# Patient Record
Sex: MALE | Race: OTHER
[De-identification: names, ages, dates, MRNs, and addresses within clinical notes are randomized per-mention and may not be internally consistent; named-entity substitution may affect disease eponyms.]

---

## 2017-05-10 ENCOUNTER — HOSPITAL ENCOUNTER (OUTPATIENT)
Dept: HOSPITAL 74 - JER | Age: 57
Setting detail: OBSERVATION
LOS: 2 days | Discharge: HOME | End: 2017-05-12
Attending: INTERNAL MEDICINE | Admitting: INTERNAL MEDICINE
Payer: COMMERCIAL

## 2017-05-10 VITALS — BODY MASS INDEX: 29 KG/M2

## 2017-05-10 DIAGNOSIS — Z79.82: ICD-10-CM

## 2017-05-10 DIAGNOSIS — I71.2: ICD-10-CM

## 2017-05-10 DIAGNOSIS — I95.2: ICD-10-CM

## 2017-05-10 DIAGNOSIS — I10: ICD-10-CM

## 2017-05-10 DIAGNOSIS — E78.5: ICD-10-CM

## 2017-05-10 DIAGNOSIS — Z91.013: ICD-10-CM

## 2017-05-10 DIAGNOSIS — I35.1: ICD-10-CM

## 2017-05-10 DIAGNOSIS — I25.10: ICD-10-CM

## 2017-05-10 DIAGNOSIS — R55: Primary | ICD-10-CM

## 2017-05-10 LAB
ALBUMIN SERPL-MCNC: 4 G/DL (ref 3.4–5)
ALP SERPL-CCNC: 54 U/L (ref 45–117)
ALT SERPL-CCNC: 24 U/L (ref 12–78)
ANION GAP SERPL CALC-SCNC: 12 MMOL/L (ref 8–16)
AST SERPL-CCNC: 22 U/L (ref 15–37)
BASOPHILS # BLD: 0.5 % (ref 0–2)
BILIRUB SERPL-MCNC: 0.8 MG/DL (ref 0.2–1)
CALCIUM SERPL-MCNC: 9 MG/DL (ref 8.5–10.1)
CO2 SERPL-SCNC: 26 MMOL/L (ref 21–32)
COCKROFT - GAULT: 68.03
CREAT SERPL-MCNC: 1.4 MG/DL (ref 0.7–1.3)
DEPRECATED RDW RBC AUTO: 14.3 % (ref 11.9–15.9)
EOSINOPHIL # BLD: 1 % (ref 0–4.5)
GLUCOSE SERPL-MCNC: 112 MG/DL (ref 74–106)
INR BLD: 1.05 (ref 0.82–1.09)
MCH RBC QN AUTO: 30.6 PG (ref 25.7–33.7)
MCHC RBC AUTO-ENTMCNC: 34.1 G/DL (ref 32–35.9)
MCV RBC: 89.7 FL (ref 80–96)
NEUTROPHILS # BLD: 73.3 % (ref 42.8–82.8)
PLATELET # BLD AUTO: 175 K/MM3 (ref 134–434)
PMV BLD: 7.7 FL (ref 7.5–11.1)
PROT SERPL-MCNC: 7.2 G/DL (ref 6.4–8.2)
PT PNL PPP: 11.6 SEC (ref 9.98–11.88)
TROPONIN I SERPL-MCNC: < 0.02 NG/ML (ref 0–0.05)
WBC # BLD AUTO: 5.6 K/MM3 (ref 4–10)

## 2017-05-10 PROCEDURE — G0378 HOSPITAL OBSERVATION PER HR: HCPCS

## 2017-05-10 PROCEDURE — 3E0337Z INTRODUCTION OF ELECTROLYTIC AND WATER BALANCE SUBSTANCE INTO PERIPHERAL VEIN, PERCUTANEOUS APPROACH: ICD-10-PCS | Performed by: INTERNAL MEDICINE

## 2017-05-10 PROCEDURE — 3E033GC INTRODUCTION OF OTHER THERAPEUTIC SUBSTANCE INTO PERIPHERAL VEIN, PERCUTANEOUS APPROACH: ICD-10-PCS | Performed by: INTERNAL MEDICINE

## 2017-05-10 NOTE — PDOC
History of Present Illness





- General


History Source: Patient


Exam Limitations: No Limitations





<Joel Brown - Last Filed: 05/10/17 16:53>





<Ingrid Pang - Last Filed: 05/10/17 19:20>





- General


Chief Complaint: Syncope/Near Syncope


Stated Complaint: FALL


Time Seen by Provider: 05/10/17 13:01





- History of Present Illness


Initial Comments: 


05/10/17 14:30


The patient is a 56 year old male, BIBA with a significant past medical history 

of HTN and HLD, who presents to the emergency department s/p syncopal fall 

around 11:30 today. The patient reports having a less than 5 minute of LOC in 

the bank earlier today. He reports just prior to the LOC being symptomatic for 

blurry vision and dizziness.  He denies any recent fevers, chills, headache or 

dizziness. He denies any recent nausea, vomit, diarrhea or constipation. He 

denies any recent chest pain or shortness of breath. He arrives with no 

complaints of pain or discomfort. 





Allergies: NKA


Past surgical history: None reported.


Social History: Nonsmoker. Denies EtOH use and recreational drug use. 


 (Joel Brown)








Past History





<Joel Brown - Last Filed: 05/10/17 16:53>





- Past Medical History


HTN: Yes


Hypercholesterolemia: Yes





- Psycho/Social/Smoking Cessation Hx


Suicidal Ideation: No


Smoking History: Never smoked





<Ingrid Pang - Last Filed: 05/10/17 19:20>





- Past Medical History


Allergies/Adverse Reactions: 


 Allergies











Allergy/AdvReac Type Severity Reaction Status Date / Time


 


Fish Containing Products Allergy   Verified 05/10/17 12:27











Home Medications: 


Ambulatory Orders





Aspirin [ASA -] 81 mg PO DAILY 05/10/17 


Isosorbide Mononitrate [Isosorbide Mononitrate ER] 120 mg PO DAILY 05/10/17 


Labetalol HCl [Normodyne -] 300 mg PO BID 05/10/17 


Nifedipine [Nifedipine ER] 60 mg PO BID 05/10/17 


Rosuvastatin Calcium [Crestor] 40 mg PO DAILY 05/10/17 











**Review of Systems





- Review of Systems


Able to Perform ROS?: Yes





<Joel Brown - Last Filed: 05/10/17 16:53>





<Ingrid Pang - Last Filed: 05/10/17 19:20>





- Review of Systems


Comments:: 





05/10/17 14:30


GENERAL/CONSTITUTIONAL: No fever or chills. No weakness.


HEAD, EYES, EARS, NOSE AND THROAT: No change in vision. No ear pain or 

discharge. No sore throat.


CARDIOVASCULAR: No chest pain or shortness of breath.


RESPIRATORY: No cough, wheezing, or hemoptysis.


GASTROINTESTINAL: No nausea, vomiting, diarrhea or constipation.


GENITOURINARY: No dysuria, frequency, or change in urination.


MUSCULOSKELETAL: No joint or muscle swelling or pain. No neck or back pain.


SKIN: No rash


NEUROLOGIC: No headache, vertigo, loss of consciousness, or change in strength/

sensation.


ENDOCRINE: No increased thirst. No abnormal weight change.


HEMATOLOGIC/LYMPHATIC: No anemia, easy bleeding, or history of blood clots.


ALLERGIC/IMMUNOLOGIC: No hives or skin allergy. (Joel Brown)








*Physical Exam





<Joel Brown - Last Filed: 05/10/17 16:53>





<Ingrid Pang - Last Filed: 05/10/17 19:20>





- Vital Signs


 Last Vital Signs











Temp Pulse Resp BP Pulse Ox


 


 98.9 F   91 H  17   110/75   98 


 


 05/10/17 19:00  05/10/17 19:00  05/10/17 19:00  05/10/17 19:00  05/10/17 19:00

















- Physical Exam


Comments: 





05/10/17 16:30


GENERAL: Awake, alert, and fully oriented, in no acute distress


HEAD: No signs of trauma


EYES: PERRLA, EOMI, sclera anicteric, conjunctiva clear


ENT: Auricles normal inspection, hearing grossly normal, nares patent, Moist 

mucosa


NECK: Normal ROM, supple, JVD, or masses


LUNGS: Breath sounds equal, clear to auscultation bilaterally.  No wheezes, and 

no crackles


HEART: Regular rate and rhythm, normal S1 and S2, no murmurs, rubs or gallops


ABDOMEN: Soft, nontender, normoactive bowel sounds.  No guarding, no rebound.  

No masses


EXTREMITIES: Normal range of motion, no edema.  No clubbing or cyanosis. No 

cords, erythema, or tenderness. 2+DP/PT pulses. 


NEUROLOGICAL:  Normal speech, normal gait, moves all extremities equally. 

Speech clear. 


SKIN: Warm, Dry, normal turgor, no rashes or lesions noted.


 (Joel Brown)








ED Treatment Course





- LABORATORY


CBC & Chemistry Diagram: 


 05/10/17 13:30





 05/10/17 13:30





<Joel Brown - Last Filed: 05/10/17 16:53>





- LABORATORY


CBC & Chemistry Diagram: 


 05/10/17 13:30





 05/10/17 13:30





<Ingrid Pang - Last Filed: 05/10/17 19:20>





- ADDITIONAL ORDERS


Additional order review: 


 Laboratory  Results











  05/10/17 05/10/17





  13:30 13:04


 


INR   1.05


 


Sodium  140 


 


Potassium  3.9 


 


Chloride  102 


 


Carbon Dioxide  26 


 


Anion Gap  12 


 


BUN  16 


 


Creatinine  1.4 H 


 


Creat Clearance w eGFR  52.42 


 


Random Glucose  112 H 


 


Calcium  9.0 


 


Total Bilirubin  0.8 


 


AST  22 


 


ALT  24 


 


Alkaline Phosphatase  54 


 


Creatine Kinase  122 


 


Troponin I  < 0.02 


 


Total Protein  7.2 


 


Albumin  4.0 








 











  05/10/17





  13:30


 


RBC  4.31


 


MCV  89.7


 


MCHC  34.1


 


RDW  14.3


 


MPV  7.7


 


Neutrophils %  73.3


 


Lymphocytes %  15.7


 


Monocytes %  9.5


 


Eosinophils %  1.0


 


Basophils %  0.5

















- RADIOLOGY


Radiology Studies Ordered: 














 Category Date Time Status


 


 ABDOMEN CTA W/WO CONTRAST [CT] Stat CT Scan  05/10/17 18:52 Ordered


 


 CHEST CTA [CT] Stat CT Scan  05/10/17 18:51 Ordered


 


 HEAD CT WITHOUT CONTRAST [CT] Stat CT Scan  05/10/17 14:19 Completed


 


 CXRPORT [CHEST X-RAY PORTABLE*] [RAD] Stat Radiology  05/10/17 14:19 Completed

















- Medications


Given in the ED: 


ED Medications














Discontinued Medications














Generic Name Dose Route Start Last Admin





  Trade Name Freq  PRN Reason Stop Dose Admin


 


Aspirin  162 mg 05/10/17 14:27 05/10/17 14:33





  Asa -  PO 05/10/17 14:28  Not Given





  ONCE ONE   


 


Sodium Chloride  1,000 ml 05/10/17 14:26 05/10/17 14:32





  Normal Saline -  IV 05/10/17 14:27  1,000 ml





  ONCE ONE   Administration

















Medical Decision Making





<Joel Brown - Last Filed: 05/10/17 16:53>





<Ingrid Pang - Last Filed: 05/10/17 19:20>





- Medical Decision Making





05/10/17 16:53


Call made to , case discussed. 


 (Joel Brown)


05/10/17 14:20


55 yo male with h/o HTN HLD, here with syncopal episode today while at the bank

, on baby aspirin.  unsure if hit head, brief loc for 5 min. no prior syncopal 

epsiodes . no cp sob precipitating. no dark stools receently. EMS brought to 

hospital noted to be hypotensive.  no current complaints of headache. 


on exam awake alert neurologically intact. no head trauma. no cervical 

tendnerss. cardiac exam normal. 


differential : traumatic, Ich, hypoglycemia, dehyration anemia, vasovagal, 

dysrhtymia, electrlyte abnormality. plan  ct head, ekg cardiac markers. tele, 

iv hydration labs,  urinalysis. brandon d/w patients cardiologist





05/10/17 17:55


d/w pt covering cardiologist dr douglas ( dr. suazo) who states pt had normal 

stress 2/17, echo last year was reportedly normal. also has h/o asc. aortic 

anuerysm. ct angio added. will see pt in hospital . will observe on telemetry. d

/w dr mckeon, ( admitting for marimar santizo)  who accepted admission to tele.





05/10/17 19:20 (Ingrid Pang)








*DC/Admit/Observation/Transfer





<Joel Brown - Last Filed: 05/10/17 16:53>





- Discharge Dispostion


Admit: Yes





<Ingrid Pang - Last Filed: 05/10/17 19:20>


Diagnosis at time of Disposition: 


 Syncope





- Referrals


Referrals: 


Ifrah Joseph MD [Primary Care Provider] - 





- Attestations


Scribe Attestion: 





05/10/17 14:35





Documentation prepared by Joel Brown, acting as medical scribe for Ingrid Pang MD.


 (Joel Brown)

## 2017-05-10 NOTE — HP
Admitting History and Physical





- Admission


History of Present Illness: 


Pt is a 55 y/o male with PMH significant for HTN and HLD. Pt was brought to the 

ER bc of syncopal episode w/ LOC while in the bank.  Pt denies any HENDERSON, chest 

pain or palpitations. The patient reports having a less than 5 minute of LOC in 

the bank earlier today. Pt had ct scan head wc was unremarkable and CTA chest/

abdomen wc showed dilatation of the ascending aorta w/out dissection.


History Source: Patient, Medical Record





- Past Medical History


Cardiovascular: Yes: HTN, Hyperlipdemia





- Smoking History


Smoking history: Never smoked





Home Medications





- Allergies


Allergies/Adverse Reactions: 


 Allergies











Allergy/AdvReac Type Severity Reaction Status Date / Time


 


Fish Containing Products Allergy   Verified 05/10/17 12:27














- Home Medications


Home Medications: 


Ambulatory Orders





Aspirin [ASA -] 81 mg PO DAILY 05/10/17 


Isosorbide Mononitrate [Isosorbide Mononitrate ER] 120 mg PO DAILY 05/10/17 


Labetalol HCl [Normodyne -] 300 mg PO BID 05/10/17 


Rosuvastatin Calcium [Crestor] 40 mg PO DAILY 05/10/17 


Losartan Potassium [Cozaar -] 50 mg PO DAILY #30 tablet 05/12/17 


Nifedipine ER [Procardia XL -] 30 mg PO DAILY #30 tab 05/12/17 











Family Disease History





- Family Disease History


Family History: Unremarkable





Review of Systems





- Review of Systems


Constitutional: reports: Weakness


HENT: reports: No Symptoms


Neck: reports: No Symptoms


Cardiovascular: reports: Other (Syncope)


Respiratory: reports: No Symptoms


Gastrointestinal: reports: No Symptoms


Genitourinary: reports: No Symptoms


Musculoskeletal: reports: No Symptoms


Neurological: reports: Change in LOC





Physical Examination


Vital Signs: 


 Vital Signs











Temperature  98.9 F   05/10/17 19:00


 


Pulse Rate  94 H  05/10/17 20:43


 


Respiratory Rate  18   05/10/17 20:43


 


Blood Pressure  127/83   05/10/17 20:43


 


O2 Sat by Pulse Oximetry (%)  97   05/10/17 21:00











Constitutional: Yes: Well Nourished


Eyes: Yes: WNL


HENT: Yes: WNL


Neck: Yes: WNL


Cardiovascular: Yes: WNL, Regular Rate and Rhythm


Respiratory: Yes: WNL, Regular, CTA Bilaterally


Gastrointestinal: Yes: WNL, Normal Bowel Sounds, Soft


Musculoskeletal: Yes: WNL


Extremities: Yes: WNL


Edema: No


Neurological: Yes: WNL, Alert, Oriented


...Motor Strength: WNL





Problem List





- Problems


(1) Syncope


Assessment/Plan: 


Monitor on tele


Cardio/neuro consults


BP was slightly hypotensive


Cont to monitor


Check eho/carotid doppler


Code(s): R55 - SYNCOPE AND COLLAPSE   Qualifiers: 


     Syncope type: unspecified        Qualified Code(s): R55 - Syncope and 

collapse  





(2) HTN (hypertension)


Assessment/Plan: 


Meidcations to be adjusted


Code(s): I10 - ESSENTIAL (PRIMARY) HYPERTENSION   





(3) Ascending aortic aneurysm


Assessment/Plan: 


Pt is to f/u as outpt w/ vascular surgeon


Code(s): I71.2 - THORACIC AORTIC ANEURYSM, WITHOUT RUPTURE

## 2017-05-10 NOTE — EKG
Test Reason : 

Blood Pressure : ***/*** mmHG

Vent. Rate : 066 BPM     Atrial Rate : 066 BPM

   P-R Int : 192 ms          QRS Dur : 094 ms

    QT Int : 424 ms       P-R-T Axes : 004 016 035 degrees

   QTc Int : 444 ms

 

NORMAL SINUS RHYTHM

MINIMAL VOLTAGE CRITERIA FOR LVH, MAY BE NORMAL VARIANT

EARLY REPOLARIZATION

BORDERLINE ECG

WHEN COMPARED WITH ECG OF 09-SEP-2011 09:51,

QRS DURATION HAS DECREASED

ST NO LONGER ELEVATED IN ANTERIOR LEADS

T WAVE INVERSION NO LONGER EVIDENT IN ANTEROLATERAL LEADS

Confirmed by CARLOS CLAROS, SHAVONNE ([id number]) on 5/10/2017 1:42:58 PM

 

Referred By:             Confirmed By:SHAVONNE GONZALEZ MD

## 2017-05-11 LAB
ALBUMIN SERPL-MCNC: 3.7 G/DL (ref 3.4–5)
ALP SERPL-CCNC: 49 U/L (ref 45–117)
ALT SERPL-CCNC: 22 U/L (ref 12–78)
ANION GAP SERPL CALC-SCNC: 8 MMOL/L (ref 8–16)
AST SERPL-CCNC: 17 U/L (ref 15–37)
BASOPHILS # BLD: 0.6 % (ref 0–2)
BILIRUB SERPL-MCNC: 0.7 MG/DL (ref 0.2–1)
CALCIUM SERPL-MCNC: 8.8 MG/DL (ref 8.5–10.1)
CHOLEST SERPL-MCNC: 222 MG/DL (ref 50–200)
CO2 SERPL-SCNC: 28 MMOL/L (ref 21–32)
COCKROFT - GAULT: 119.06
CREAT SERPL-MCNC: 0.8 MG/DL (ref 0.7–1.3)
DEPRECATED RDW RBC AUTO: 14.2 % (ref 11.9–15.9)
EOSINOPHIL # BLD: 1.5 % (ref 0–4.5)
GLUCOSE SERPL-MCNC: 115 MG/DL (ref 74–106)
LDLC SERPL CALC-MCNC: 132 MG/DL (ref 5–100)
MCH RBC QN AUTO: 30.9 PG (ref 25.7–33.7)
MCHC RBC AUTO-ENTMCNC: 34.6 G/DL (ref 32–35.9)
MCV RBC: 89.3 FL (ref 80–96)
NEUTROPHILS # BLD: 55.8 % (ref 42.8–82.8)
PLATELET # BLD AUTO: 168 K/MM3 (ref 134–434)
PMV BLD: 7.4 FL (ref 7.5–11.1)
PROT SERPL-MCNC: 6.9 G/DL (ref 6.4–8.2)
TROPONIN I SERPL-MCNC: < 0.02 NG/ML (ref 0–0.05)
WBC # BLD AUTO: 3.7 K/MM3 (ref 4–10)

## 2017-05-11 RX ADMIN — DEXTROSE AND SODIUM CHLORIDE SCH MLS/HR: 5; 450 INJECTION, SOLUTION INTRAVENOUS at 02:32

## 2017-05-11 RX ADMIN — HEPARIN SODIUM SCH UNIT: 5000 INJECTION, SOLUTION INTRAVENOUS; SUBCUTANEOUS at 10:55

## 2017-05-11 RX ADMIN — LABETALOL HCL SCH MG: 100 TABLET, FILM COATED ORAL at 21:29

## 2017-05-11 RX ADMIN — ASPIRIN 81 MG SCH MG: 81 TABLET ORAL at 10:55

## 2017-05-11 RX ADMIN — HEPARIN SODIUM SCH UNIT: 5000 INJECTION, SOLUTION INTRAVENOUS; SUBCUTANEOUS at 21:29

## 2017-05-11 RX ADMIN — LABETALOL HCL SCH MG: 100 TABLET, FILM COATED ORAL at 10:55

## 2017-05-11 NOTE — CON.CARD
Consult


Consult Specialty:: Cardiology


Referred by:: Dr. Bermudez


Reason for Consultation:: Syncope





- History of Present Illness


Chief Complaint: Syncope w/ LOC


History of Present Illness: 


56M with HTN, HL, CAD (+ nuclear stress w/ mild inferior ischemia several years 

ago managed medically), ascending aortic aneurysm 4.3 cm on CTA, moderate AR, 

[facility] admitted after a syncopal episode in bank yesterday with reported LOC.


Denies chest pain, SOB, palpitations.


Reports similar episode in [place] in July-- no work up at the time.


Of note, he was markedly hypotensive in ER with systolic BP in 80s.





Tele: so far mild sinus tach





ETTL 2/17 in office: 10:18 standard Zhen, no ischemic changes.











- History Source


History Provided By: Patient, Medical Record


Limitations to Obtaining History: No Limitations





- Past Medical History


CNS: No: Alzheimer's, CVA, Dementia, Migraine, Multiple Sclerosis, Peripheral 

Neuropathy, Parkinson's, Seizure, Syncope, TIA, Vertigo, Other


Cardio/Vascular: Yes: Aneurysm, CAD, HTN, Other (HL)


Pulmonary: No: Asthma, Bronchitis, Cancer, COPD, O2 Dependent, Pneumonia, 

Previously Intubated, Pulmonary Embolus, Pulmonary Fibrosis, Sleep Apnea, Other


Gastrointestinal: No: Ascites, Cancer, Constipation, Crohn's Disease, 

Diverticulitis, Diverticulosis, Esophageal Varices, Gastritis, GERD, GI Bleed, 

Hemorrhoids, Hiatal Hernia, Inflamatory Bowel Disease, Irritable Bowel Disease, 

Pancreatitis, Peptic Ulcer Disease, Ulcerative Colitis, Other


Hepatobiliary: No: Cirrhosis, Cholelithiasis, Cholecystitis, Choledocholithiasis

, Hepatitis A, Hepatitis B, Hepatitis C, Other


Renal/: No: Renal Failure, Renal Inusuff, BPH, Cancer, Hematuria, Hemodialysis

, Neurogenic Bladder, Renal Calculi, UTI, Other


Heme/Onc: No: Anemia, B12 Deficiency, Bleeding Disorder, Cancer, Current 

Chemotherapy, Current Radiation Therapy, Hemochromatosis, Hypercoaguable State, 

Myeloproliferative Synd, Sickle Cell Disease, Sickle Cell Trait, 

Thrombocytopenia, Other


Infectious Disease: No: AIDS, C-Diff, Herpes Zoster, HIV, MRSA, STD's, 

Tuberculosis, VREF, Other


Psych: No: Addictions, Anxiety, Bipolar, Depression, Panic, Psychosis, 

Schizophrenia, Other


Musculoskeletal: No: Bursitis, Chronic low back pain, Hemiparesis, Hemiplegia, 

Osteoarthritis, Paraplegia, Other


Rheumatology: No: Fibromyalgia, Gout, Lupus, Rheumatoid Arthritis, Sarcoidosis, 

Vasculitis, Other


ENT: No: Allergic Rhinitis, Sinusitis, Other


Additional Medical History: 4.2/4.3 cm ascending aortic aneurysm, stable.





- Alcohol/Substance Use


Hx Alcohol Use: No


History of Substance Use: reports: None





- Smoking History


Smoking history: Never smoked





- Social History


ADL: Independent





Home Medications





- Allergies


Allergies/Adverse Reactions: 


 Allergies











Allergy/AdvReac Type Severity Reaction Status Date / Time


 


Fish Containing Products Allergy   Verified 05/10/17 12:27














- Home Medications


Home Medications: 


Ambulatory Orders





Aspirin [ASA -] 81 mg PO DAILY 05/10/17 


Isosorbide Mononitrate [Isosorbide Mononitrate ER] 120 mg PO DAILY 05/10/17 


Labetalol HCl [Normodyne -] 300 mg PO BID 05/10/17 


Nifedipine [Nifedipine ER] 60 mg PO BID 05/10/17 


Rosuvastatin Calcium [Crestor] 40 mg PO DAILY 05/10/17 











Family Disease History





- Family Disease History


Family History: Unremarkable (no early CAD or SCD)





Review of Systems


Findings/Remarks: 


see HPI





- Review of Systems


Constitutional: reports: No Symptoms


Eyes: reports: No Symptoms


HENT: reports: No Symptoms


Neck: reports: No Symptoms


Cardiovascular: reports: No Symptoms


Respiratory: reports: No Symptoms


Gastrointestinal: reports: No Symptoms


Genitourinary: reports: No Symptoms


Breasts: reports: No Symptoms Reported


Musculoskeletal: reports: No Symptoms


Integumentary: reports: No Symptoms


Neurological: reports: Syncope


Endocrine: denies: No Symptoms, Excessive Sweating, Flushing, Increased Hunger, 

Increased Thirst, Intolerance to Cold, Intolerance to Heat, Unexplained Weight 

Gain, Unexplained Weight Loss, Other


Hematology/Lymphatic: denies: No Symptoms, Easily Bruised, Excessive Bleeding, 

Swollen Glands, Other


Psychiatric: denies: No Symptoms, Altered Sleep Pattern, Anxiety, Depression, 

Hallucinations, Panic, Paranoia, Suicidal, Other





- Risk Factors


Known Risk Factors: Yes: Hypertension


Vital Signs: 


 Vital Signs











Temperature  98.2 F   05/11/17 05:25


 


Pulse Rate  84   05/11/17 05:25


 


Respiratory Rate  20   05/11/17 05:25


 


Blood Pressure  124/82   05/11/17 05:25


 


O2 Sat by Pulse Oximetry (%)  97   05/10/17 21:00











Constitutional: Yes: No Distress


Eyes: Yes: Conjunctiva Clear


HENT: Yes: Atraumatic


Neck: Yes: Supple


Respiratory: Yes: CTA Bilaterally


Gastrointestinal: Yes: Soft


Cardiovascular: Yes: Regular Rate and Rhythm


JVD: No


Carotid Bruit: No


PMI: Non-Displaced


Edema: No


Peripheral Pulses WNL: Yes


Neurological: Yes: Alert, Oriented





- Other Data


Labs, Other Data: 


 CBC, BMP





 05/11/17 05:35 





 05/11/17 05:35 





 INR, PTT











INR  1.05  (0.82-1.09)   05/10/17  13:04    








 Laboratory Tests











  05/10/17 05/10/17 05/11/17





  13:04 13:30 05:35


 


WBC    3.7 L D


 


Hgb    12.9


 


Plt Count    168


 


INR  1.05  


 


Sodium   


 


Potassium   


 


BUN   


 


Creatinine   


 


Creatine Kinase   122 


 


Troponin I   < 0.02 














  05/11/17





  05:35


 


WBC 


 


Hgb 


 


Plt Count 


 


INR 


 


Sodium  139


 


Potassium  3.3 L


 


BUN  11  D


 


Creatinine  0.8  D


 


Creatine Kinase 


 


Troponin I 











NSR 73bpm, LVH


Echo: Pending


Ejection Fraction %: LVEF > or = 40 %





Imaging





- Results


Cat Scan: Report Reviewed


EKG: Image Reviewed





Problem List





- Problems


(1) Syncope


Assessment/Plan: 


-2nd episode in last year


-Tele to rule out arrhythmia


-Would hold Imdur and Procardia for now and observe BP (Procardia with 

significant vasodilatory effects)


-Check orthostatics.


-Echo


-Carotid US


Code(s): R55 - SYNCOPE AND COLLAPSE   Qualifiers: 


     Syncope type: unspecified        Qualified Code(s): R55 - Syncope and 

collapse  





(2) Hypotension


Assessment/Plan: 


-Likely due to meds (Imdur and Procardia)


-Hold for now and observe BP


-Can continue Labetalol with hold parameters


Code(s): I95.9 - HYPOTENSION, UNSPECIFIED   Qualifiers: 


     Hypotension type: hypotension due to drug        Qualified Code(s): I95.2 

- Hypotension due to drugs  





(3) CAD (coronary artery disease)


Assessment/Plan: 


-Most recent ETT 2/2017 with no ischemic changes


-Nuclear stress several years ago with diaphragmatic attenuation, no ischemia: 

was managed medically due to normal EF, asymptomatic status and good exercise 

capacity


-Will cycle cardiac enzymes


-Can continue ASA


Code(s): I25.10 - ATHSCL HEART DISEASE OF NATIVE CORONARY ARTERY W/O ANG PCTRS 

  Qualifiers: 


     Coronary Disease-Associated Artery/Lesion type: native artery     Native 

vs. transplanted heart: native heart     Associated angina: without angina     

   Qualified Code(s): I25.10 - Atherosclerotic heart disease of native coronary 

artery without angina pectoris  





(4) Aortic regurgitation


Assessment/Plan: 


-moderate on previous echo


-Repeat echo to assess EF and follow up on AR severity


Code(s): I35.1 - NONRHEUMATIC AORTIC (VALVE) INSUFFICIENCY   Qualifiers: 


     Cardiac valve disease etiology: nonrheumatic        Qualified Code(s): 

I35.1 - Nonrheumatic aortic (valve) insufficiency  





(5) Ascending aortic aneurysm


Assessment/Plan: 


-small and stable at 4.2/4.3cm on CTA done yesterday


-No dissection


-Continue beta blockers as BP allows


-Echo to re-assess AR severity


Code(s): I71.2 - THORACIC AORTIC ANEURYSM, WITHOUT RUPTURE

## 2017-05-11 NOTE — PN
Progress Note, Physician





- Current Medication List


Current Medications: 


Active Medications





Aspirin (Asa -)  81 mg PO DAILY [facility]


   Last Admin: 05/11/17 10:55 Dose:  81 mg


Heparin Sodium (Porcine) (Heparin -)  5,000 unit SQ BID [facility]


   Last Admin: 05/11/17 10:55 Dose:  5,000 unit


Dextrose/Sodium Chloride (D5-1/2ns -)  1,000 mls @ 75 mls/hr IV ASDIR [facility]


   Last Admin: 05/11/17 02:32 Dose:  75 mls/hr


Labetalol HCl (Normodyne -)  300 mg PO BID [facility]


   Last Admin: 05/11/17 10:55 Dose:  300 mg


Rosuvastatin Calcium (Crestor -)  40 mg PO HS [facility]











- Objective


Vital Signs: 


 Vital Signs











Temperature  98.6 F   05/11/17 17:00


 


Pulse Rate  80   05/11/17 17:00


 


Respiratory Rate  20   05/11/17 17:00


 


Blood Pressure  158/108   05/11/17 17:00


 


O2 Sat by Pulse Oximetry (%)  97   05/11/17 09:10











Constitutional: Yes: Well Nourished


Eyes: Yes: WNL


HENT: Yes: WNL


Neck: Yes: Supple


Cardiovascular: Yes: WNL, Regular Rate and Rhythm


Respiratory: Yes: WNL, Regular, CTA Bilaterally


Gastrointestinal: Yes: WNL, Normal Bowel Sounds, Soft


Labs: 


 CBC, BMP





 05/11/17 05:35 





 05/11/17 05:35 





 INR, PTT











INR  1.05  (0.82-1.09)   05/10/17  13:04    














Assessment/Plan


1. Syncope      Cont tele


                     Probable due to orthostatic changes vs dehydration


                     Await echo


                     DC planning for am 


2. HTN/HLD      lipid panel slightly elevated


                     BP improved  


                     Cont labetalol/asa 


                     Cont crestor

## 2017-05-11 NOTE — CONSULT
Consult - text type





- Consultation


Consultation Note: 


NEUROLOGY CONSULTATION is greatly appreciated:





This 55 yo RH  man is examined with his wife present who aides in 

translation.


PMH sig for HTN, Chol, ASHD, s/p IWMI, Ascending aortic aneurysm.


Maintained on Isordil (120 mg), Labetolol (300 mg BID), Nifedipine and Crestor.





Yesterday AM, after taking all meds, went to the bank where he was standing in 

line and became lightheaded and suffered witnessed LOC x approx 5 mins.


No seizure activity, diaphoresis, incontinence or tongue biting. No head injury 

or headache.





EMS described hypotension with Systolic BP of 80 mm Hg.





CT of head (Reviewed): Entirely normal.


Carotid duplex dopplers: Normal





EXAM: No evidence of external head trauma. No carotid bruits. Cor: 60's reg


NEURO: MS/Speech: Normal


           CN II-XII: Normal without nystagmus


           Motor: No drift or tremor. Normal strength, tone and bulk. Toes 

downgoing


           Coord: No FTN dystaxia


           Sensory: Normal. Romberg -


           Gait: Normal





IMP: Normal neurological exam.


       Syncope. Probably due to meds plus orthostatic factors.


       R/O arrythmia.





Suggest: Agree with telemetry


             May even consider more prolonged ambulatory monitoring as out 

patient.


             Check orthostatic BP's.


             Keep well-hydrated.


             Cardiology follow-up as out patient.





Thank you very much,


Ricardo Davis MD

## 2017-05-11 NOTE — EKG
Test Reason : 

Blood Pressure : ***/*** mmHG

Vent. Rate : 073 BPM     Atrial Rate : 073 BPM

   P-R Int : 196 ms          QRS Dur : 094 ms

    QT Int : 428 ms       P-R-T Axes : 035 028 053 degrees

   QTc Int : 471 ms

 

NORMAL SINUS RHYTHM

MODERATE VOLTAGE CRITERIA FOR LVH, MAY BE NORMAL VARIANT

BORDERLINE ECG

WHEN COMPARED WITH ECG OF 10-MAY-2017 12:41,

NO SIGNIFICANT CHANGE WAS FOUND

Confirmed by CAL CLAROS, JUSTINE ([id number]) on 5/11/2017 12:44:00 PM

 

Referred By:             Confirmed By:JUSTINE MCCALL MD

## 2017-05-12 VITALS — TEMPERATURE: 98.8 F | DIASTOLIC BLOOD PRESSURE: 90 MMHG | SYSTOLIC BLOOD PRESSURE: 144 MMHG

## 2017-05-12 VITALS — HEART RATE: 60 BPM

## 2017-05-12 LAB
ALBUMIN SERPL-MCNC: 4 G/DL (ref 3.4–5)
ALP SERPL-CCNC: 53 U/L (ref 45–117)
ALT SERPL-CCNC: 26 U/L (ref 12–78)
ANION GAP SERPL CALC-SCNC: 12 MMOL/L (ref 8–16)
AST SERPL-CCNC: 20 U/L (ref 15–37)
BILIRUB SERPL-MCNC: 0.8 MG/DL (ref 0.2–1)
CALCIUM SERPL-MCNC: 9.1 MG/DL (ref 8.5–10.1)
CO2 SERPL-SCNC: 28 MMOL/L (ref 21–32)
COCKROFT - GAULT: 105.83
CREAT SERPL-MCNC: 0.9 MG/DL (ref 0.7–1.3)
GLUCOSE SERPL-MCNC: 89 MG/DL (ref 74–106)
PROT SERPL-MCNC: 7.2 G/DL (ref 6.4–8.2)

## 2017-05-12 RX ADMIN — HEPARIN SODIUM SCH UNIT: 5000 INJECTION, SOLUTION INTRAVENOUS; SUBCUTANEOUS at 10:02

## 2017-05-12 RX ADMIN — LABETALOL HCL SCH: 100 TABLET, FILM COATED ORAL at 09:37

## 2017-05-12 RX ADMIN — LABETALOL HCL SCH MG: 100 TABLET, FILM COATED ORAL at 07:29

## 2017-05-12 RX ADMIN — ASPIRIN 81 MG SCH MG: 81 TABLET ORAL at 10:02

## 2017-05-12 RX ADMIN — DEXTROSE AND SODIUM CHLORIDE SCH MLS/HR: 5; 450 INJECTION, SOLUTION INTRAVENOUS at 01:50

## 2017-05-12 NOTE — PN
Progress Note, Physician


History of Present Illness: 


seen and examined today in [facility]. no overnight events. no new complaints. feeling 

better.








- Current Medication List


Current Medications: 


Active Medications





Aspirin (Asa -)  81 mg PO DAILY [facility]


   Last Admin: 05/11/17 10:55 Dose:  81 mg


Heparin Sodium (Porcine) (Heparin -)  5,000 unit SQ BID [facility]


   Last Admin: 05/11/17 21:29 Dose:  5,000 unit


Dextrose/Sodium Chloride (D5-1/2ns -)  1,000 mls @ 75 mls/hr IV ASDIR [facility]


   Last Admin: 05/12/17 01:50 Dose:  75 mls/hr


Labetalol HCl (Normodyne -)  300 mg PO BID [facility]


   Last Admin: 05/12/17 07:29 Dose:  300 mg


Rosuvastatin Calcium (Crestor -)  40 mg PO HS [facility]











- Objective


Vital Signs: 


 Vital Signs











Temperature  98.8 F   05/12/17 09:00


 


Pulse Rate  62   05/12/17 09:00


 


Respiratory Rate  20   05/12/17 09:00


 


Blood Pressure  144/90   05/12/17 09:00


 


O2 Sat by Pulse Oximetry (%)  97   05/12/17 01:00











Constitutional: Yes: Well Nourished, No Distress, Calm


Eyes: Yes: WNL, Conjunctiva Clear, EOM Intact, PERRL


HENT: Yes: WNL, Atraumatic, Normocephalic


Neck: Yes: WNL, Supple, Trachea Midline


Cardiovascular: Yes: WNL, Regular Rate and Rhythm, S1, S2.  No: Bradycardia, 

Tachycardia, Pulse Irregular, Bruit, JVD, Gallop, Murmur, Rub, S3, S4, 

Varicosities


Respiratory: Yes: WNL, Regular, CTA Bilaterally.  No: Rales, Rhonchi, Wheezes


Gastrointestinal: Yes: WNL, Normal Bowel Sounds, Soft.  No: Distention, 

Tenderness


Musculoskeletal: Yes: WNL


Extremities: Yes: WNL


Edema: No


Peripheral Pulses WNL: Yes


Peripheral Pulses: Left Doralis Pedis: 2+, Right Dorsalis Pedis: 2+


Integumentary: Yes: WNL


Neurological: Yes: WNL, Alert, Oriented, Cran Nerves II-XII Intact


...Motor Strength: WNL


Psychiatric: Yes: WNL, Alert, Oriented


Labs: 


 CBC, BMP





 05/11/17 05:35 





 05/12/17 05:40 





 INR, PTT











INR  1.05  (0.82-1.09)   05/10/17  13:04    














- ....Imaging


Chest X-ray: Report Reviewed, Image Reviewed


EKG: Report Reviewed, Image Reviewed


Other: Report Reviewed, Image Reviewed (tele-sinus bradycardia 40-50s, nsr, pvcs

, apcs, sinus tach vs psvt initially on admission)





Assessment/Plan


56 year old man with a history of HTN, HLD, ascending aortic aneursym, CAD 

based on stress test years ago admitted with syncope and associated hypotension.





Syncope-likely due to orthostatic hypotension in the setting of multiple anti-

HTN medications, 2 episodes in past year


-echo showed normal LV/RV size and function, trace valvular regurgitation


-carotid doppler showed minimal plaque with no stenosis


-CTA chest showed mild asc aortic aneurysm with no dissection


-tele has showed sinus tach, sinus bradycardia, pvcs, apcs, no sig arrhythmia 

that would account for syncope


-pt is acceptable for discharge from a cardiac standpoint with a plan for close 

outpatient follow up with his cardiologist Dr. Ifrah Joseph next thursday


-will plan for likely longer term event monitor as outpatient vs loop recorder


-would check orthostatic BP prior to admission





HTN-hypotensive on admission, suspect medication non-adherence in past with 

resumption of all meds at once on day prior to admission


-cont Labetalol


-will resume nifedipine xl at 30mg daily for now


-stop Imdur





Aortic regurgitation-moderate on outpatient echo, not seen on echo here


-f/up as outpatient





Thoracic aortic aneurysm-mild and stable with no dissection


-cont bblocker and HTN control


-outpatient f/up





CAD-stable


-outpatient f/up

## 2017-05-13 NOTE — DS
Physical Examination


Vital Signs: 


 Vital Signs











Temperature  98.8 F   05/12/17 09:00


 


Pulse Rate  60   05/12/17 09:00


 


Respiratory Rate  20   05/12/17 09:00


 


Blood Pressure  144/90   05/12/17 09:00


 


O2 Sat by Pulse Oximetry (%)  98   05/12/17 09:00











Constitutional: Yes: No Distress


HENT: Yes: WNL


Neck: Yes: WNL


Cardiovascular: Yes: WNL, Regular Rate and Rhythm


Respiratory: Yes: WNL, Regular, CTA Bilaterally


Gastrointestinal: Yes: WNL


Labs: 


 CBC, BMP





 05/11/17 05:35 





 05/12/17 05:40 











Discharge Summary


Reason For Visit: SYNCOPE


Syncope


AAA


HTN


Hospital Course: 


Pt is a 56 y/o male who was admitted to [facility] w/ syncope.  Pt had ct scan head 

wc was unremarkable and also had cta chest/abdomen wc showed AAA but no 

disection.  Pt was seen by neuro/cardio and it was felt that syncope could have 

been due to orthostatic changes


Condition: Fair





- Instructions


Diet, Activity, Other Instructions: 


2 gram sodium diet


Referrals: 


Ifrah Joseph MD [Primary Care Provider] - 


Disposition: HOME





- Home Medications


Comprehensive Discharge Medication List: 


Ambulatory Orders





Aspirin [ASA -] 81 mg PO DAILY 05/10/17 


Isosorbide Mononitrate [Isosorbide Mononitrate ER] 120 mg PO DAILY 05/10/17 


Labetalol HCl [Normodyne -] 300 mg PO BID 05/10/17 


Rosuvastatin Calcium [Crestor] 40 mg PO DAILY 05/10/17 


Losartan Potassium [Cozaar -] 50 mg PO DAILY #30 tablet 05/12/17 


Nifedipine ER [Procardia XL -] 30 mg PO DAILY #30 tab 05/12/17

## 2021-01-16 ENCOUNTER — HOSPITAL ENCOUNTER (EMERGENCY)
Dept: HOSPITAL 74 - JER | Age: 61
Discharge: HOME | End: 2021-01-16
Payer: COMMERCIAL

## 2021-01-16 VITALS — BODY MASS INDEX: 25.8 KG/M2

## 2021-01-16 VITALS — SYSTOLIC BLOOD PRESSURE: 115 MMHG | DIASTOLIC BLOOD PRESSURE: 78 MMHG | HEART RATE: 91 BPM

## 2021-01-16 VITALS — TEMPERATURE: 98 F

## 2021-01-16 DIAGNOSIS — U07.1: Primary | ICD-10-CM

## 2021-01-16 LAB
ANION GAP SERPL CALC-SCNC: 9 MMOL/L (ref 8–16)
BUN SERPL-MCNC: 24.2 MG/DL (ref 7–18)
CALCIUM SERPL-MCNC: 8.4 MG/DL (ref 8.5–10.1)
CHLORIDE SERPL-SCNC: 92 MMOL/L (ref 98–107)
CO2 SERPL-SCNC: 25 MMOL/L (ref 21–32)
CREAT SERPL-MCNC: 1.2 MG/DL (ref 0.55–1.3)
DEPRECATED RDW RBC AUTO: 13.6 % (ref 11.9–15.9)
GLUCOSE SERPL-MCNC: 107 MG/DL (ref 74–106)
HCT VFR BLD CALC: 35.3 % (ref 35.4–49)
HGB BLD-MCNC: 12.4 GM/DL (ref 11.7–16.9)
MCH RBC QN AUTO: 30.7 PG (ref 25.7–33.7)
MCHC RBC AUTO-ENTMCNC: 35 G/DL (ref 32–35.9)
MCV RBC: 87.8 FL (ref 80–96)
PLATELET # BLD AUTO: 175 K/MM3 (ref 134–434)
PMV BLD: 8 FL (ref 7.5–11.1)
POTASSIUM SERPLBLD-SCNC: 3.9 MMOL/L (ref 3.5–5.1)
RBC # BLD AUTO: 4.03 M/MM3 (ref 4–5.6)
SODIUM SERPL-SCNC: 126 MMOL/L (ref 136–145)
WBC # BLD AUTO: 6.1 K/MM3 (ref 4–10)

## 2021-01-16 PROCEDURE — M0243 CASIRIVI AND IMDEVI INFUSION: HCPCS

## 2022-12-14 ENCOUNTER — APPOINTMENT (OUTPATIENT)
Dept: HEART AND VASCULAR | Facility: CLINIC | Age: 62
End: 2022-12-14

## 2022-12-14 DIAGNOSIS — Z86.39 PERSONAL HISTORY OF OTHER ENDOCRINE, NUTRITIONAL AND METABOLIC DISEASE: ICD-10-CM

## 2022-12-14 PROBLEM — Z00.00 ENCOUNTER FOR PREVENTIVE HEALTH EXAMINATION: Status: ACTIVE | Noted: 2022-12-14

## 2022-12-14 PROCEDURE — 99212 OFFICE O/P EST SF 10 MIN: CPT | Mod: 25

## 2022-12-14 PROCEDURE — 93000 ELECTROCARDIOGRAM COMPLETE: CPT

## 2022-12-24 PROBLEM — Z86.39 HISTORY OF HYPERLIPIDEMIA: Status: RESOLVED | Noted: 2022-12-24 | Resolved: 2022-12-24

## 2022-12-24 NOTE — HISTORY OF PRESENT ILLNESS
[FreeTextEntry1] : 62M htn, hld who presents for f/u. He denies chest pain, sob, palpitations, weakness, diaphoresis or loc. No smoking, No EtOH. Compliant with meds. Last LDL elevated to 174, crestor increased. US Carotids negative for obstructive dz; TTE with normal biventricular function. BP elevated at PMD office--now on coreg and amlodipine

## 2022-12-24 NOTE — PHYSICAL EXAM
[Well Developed] : well developed [Well Nourished] : well nourished [No Acute Distress] : no acute distress [Normal Conjunctiva] : normal conjunctiva [Normal Venous Pressure] : normal venous pressure [No Carotid Bruit] : no carotid bruit [Normal S1, S2] : normal S1, S2 [No Murmur] : no murmur [Clear Lung Fields] : clear lung fields [Good Air Entry] : good air entry [No Respiratory Distress] : no respiratory distress  [Soft] : abdomen soft [Non Tender] : non-tender [Normal Bowel Sounds] : normal bowel sounds [Normal Gait] : normal gait [No Edema] : no edema [No Cyanosis] : no cyanosis [Moves all extremities] : moves all extremities [No Focal Deficits] : no focal deficits [Normal Speech] : normal speech [Alert and Oriented] : alert and oriented [Normal memory] : normal memory

## 2022-12-24 NOTE — DISCUSSION/SUMMARY
[FreeTextEntry1] : 62M hx of HTN, HLD who presents for f/u\par \par HLD: Rosuvastatin increased to 40mg recently. Will need refill today. Lipid Panel by PMD at next visit\par \par HTN: BP stable now; On coreg and amlodpine

## 2023-01-11 NOTE — REVIEW OF SYSTEMS
Detail Level: Detailed Show Aperture Variable?: Yes Consent: The patient's consent was obtained including but not limited to risks of crusting, scabbing, blistering, scarring, darker or lighter pigmentary change, recurrence, incomplete removal and infection. Render Post-Care Instructions In Note?: no Post-Care Instructions: I reviewed with the patient in detail post-care instructions. Patient is to wear sunprotection, and avoid picking at any of the treated lesions. Pt may apply Vaseline to crusted or scabbing areas. Duration Of Freeze Thaw-Cycle (Seconds): 0 [Negative] : Heme/Lymph

## 2023-01-25 ENCOUNTER — APPOINTMENT (OUTPATIENT)
Dept: HEART AND VASCULAR | Facility: CLINIC | Age: 63
End: 2023-01-25
Payer: COMMERCIAL

## 2023-01-25 PROCEDURE — 99212 OFFICE O/P EST SF 10 MIN: CPT

## 2023-01-27 VITALS — DIASTOLIC BLOOD PRESSURE: 95 MMHG | HEART RATE: 85 BPM | SYSTOLIC BLOOD PRESSURE: 142 MMHG

## 2023-01-27 NOTE — DISCUSSION/SUMMARY
[FreeTextEntry1] : 62M hx of HTN, HLD who presents for f/u\par \par HLD: Rosuvastatin increased to 40mg recently.  Lipid Panel by PMD at next visit\par \par HTN: BP elevated today; Will inc Coreg to 12.5 mg BID

## 2023-01-27 NOTE — HISTORY OF PRESENT ILLNESS
[FreeTextEntry1] : 62M htn, hld who presents for f/u and BP check.. He denies chest pain, sob, palpitations, weakness, diaphoresis or loc. No smoking, No EtOH. Compliant with meds. Last LDL elevated to 174, crestor increased previously. US Carotids negative for obstructive dz; TTE with normal biventricular function.\par \par On Amlodipine and Coreg. BP still elevated

## 2023-04-19 ENCOUNTER — APPOINTMENT (OUTPATIENT)
Dept: HEART AND VASCULAR | Facility: CLINIC | Age: 63
End: 2023-04-19

## 2023-05-03 ENCOUNTER — APPOINTMENT (OUTPATIENT)
Dept: HEART AND VASCULAR | Facility: CLINIC | Age: 63
End: 2023-05-03
Payer: COMMERCIAL

## 2023-05-03 VITALS — DIASTOLIC BLOOD PRESSURE: 94 MMHG | SYSTOLIC BLOOD PRESSURE: 140 MMHG | HEART RATE: 82 BPM

## 2023-05-03 PROCEDURE — 99213 OFFICE O/P EST LOW 20 MIN: CPT | Mod: 25

## 2023-05-03 PROCEDURE — 93000 ELECTROCARDIOGRAM COMPLETE: CPT

## 2023-05-03 NOTE — DISCUSSION/SUMMARY
[FreeTextEntry1] : 62M hx of HTN, HLD who presents for f/u\par \par HLD: Cont on Rosuvastatin 40mg. Lipid Panel by PMD at next visit\par \par HTN: BP elevated today; Will inc Coreg to 25 mg BID. F/u with PMD next week for BP check. \par RTC in one month